# Patient Record
Sex: FEMALE | Race: WHITE | NOT HISPANIC OR LATINO | URBAN - METROPOLITAN AREA
[De-identification: names, ages, dates, MRNs, and addresses within clinical notes are randomized per-mention and may not be internally consistent; named-entity substitution may affect disease eponyms.]

---

## 2018-02-02 ENCOUNTER — NEW PATIENT (OUTPATIENT)
Dept: URBAN - METROPOLITAN AREA CLINIC 10 | Facility: CLINIC | Age: 68
End: 2018-02-02
Payer: MEDICARE

## 2018-02-02 DIAGNOSIS — H16.042 MARGINAL CORNEAL ULCER OF LEFT EYE: Primary | ICD-10-CM

## 2018-02-02 PROCEDURE — 99203 OFFICE O/P NEW LOW 30 MIN: CPT | Performed by: OPTOMETRIST

## 2018-02-02 RX ORDER — PREDNISOLONE ACETATE 10 MG/ML
1 % SUSPENSION/ DROPS OPHTHALMIC
Qty: 1 | Refills: 1 | Status: ACTIVE
Start: 2018-02-02

## 2018-02-02 ASSESSMENT — INTRAOCULAR PRESSURE
OS: 10
OD: 15

## 2018-02-03 ENCOUNTER — FOLLOW UP ESTABLISHED (OUTPATIENT)
Dept: URBAN - METROPOLITAN AREA CLINIC 10 | Facility: CLINIC | Age: 68
End: 2018-02-03
Payer: MEDICARE

## 2018-02-03 PROCEDURE — 92012 INTRM OPH EXAM EST PATIENT: CPT | Performed by: OPTOMETRIST

## 2018-02-07 ENCOUNTER — FOLLOW UP ESTABLISHED (OUTPATIENT)
Dept: URBAN - METROPOLITAN AREA CLINIC 10 | Facility: CLINIC | Age: 68
End: 2018-02-07
Payer: MEDICARE

## 2018-02-07 PROCEDURE — 92012 INTRM OPH EXAM EST PATIENT: CPT | Performed by: OPTOMETRIST

## 2018-02-07 RX ORDER — OFLOXACIN 3 MG/ML
0.3 % SOLUTION/ DROPS OPHTHALMIC
Qty: 0 | Refills: 0 | Status: ACTIVE
Start: 2018-02-07

## 2021-12-02 ENCOUNTER — OFFICE VISIT (OUTPATIENT)
Dept: URBAN - METROPOLITAN AREA CLINIC 10 | Facility: CLINIC | Age: 71
End: 2021-12-02
Payer: MEDICARE

## 2021-12-02 PROCEDURE — 99204 OFFICE O/P NEW MOD 45 MIN: CPT | Performed by: STUDENT IN AN ORGANIZED HEALTH CARE EDUCATION/TRAINING PROGRAM

## 2021-12-02 RX ORDER — ACYCLOVIR 800 MG/1
800 MG TABLET ORAL
Qty: 50 | Refills: 0 | Status: INACTIVE
Start: 2021-12-02 | End: 2021-12-11

## 2021-12-02 NOTE — IMPRESSION/PLAN
Impression: Herpesviral keratitis: B00.52. Plan: Start ACV 800mg 5 times PO for 10 days RTC 2 week short fu

## 2021-12-16 ENCOUNTER — OFFICE VISIT (OUTPATIENT)
Dept: URBAN - METROPOLITAN AREA CLINIC 10 | Facility: CLINIC | Age: 71
End: 2021-12-16
Payer: MEDICARE

## 2021-12-16 DIAGNOSIS — B00.52 HERPESVIRAL KERATITIS: Primary | ICD-10-CM

## 2021-12-16 PROCEDURE — 99214 OFFICE O/P EST MOD 30 MIN: CPT | Performed by: STUDENT IN AN ORGANIZED HEALTH CARE EDUCATION/TRAINING PROGRAM

## 2021-12-16 RX ORDER — PREDNISOLONE ACETATE 10 MG/ML
1 % SUSPENSION/ DROPS OPHTHALMIC
Qty: 10 | Refills: 1 | Status: ACTIVE
Start: 2021-12-16

## 2021-12-16 NOTE — IMPRESSION/PLAN
Impression: Herpesviral keratitis: B00.52. Plan: Resolved today Start Pred Forte QID OD x 1 week then weekly taper RTC 1mo for brief f/u

## 2021-12-29 ENCOUNTER — OFFICE VISIT (OUTPATIENT)
Dept: URBAN - METROPOLITAN AREA CLINIC 10 | Facility: CLINIC | Age: 71
End: 2021-12-29
Payer: MEDICARE

## 2021-12-29 DIAGNOSIS — H16.142 PUNCTATE KERATITIS OF LEFT EYE: Primary | ICD-10-CM

## 2021-12-29 PROCEDURE — 92012 INTRM OPH EXAM EST PATIENT: CPT | Performed by: OPTOMETRIST

## 2021-12-29 NOTE — IMPRESSION/PLAN
Impression: Punctate keratitis of left eye: H16.142. Plan: No infection today. Start Pred Forte QD OS x 1 wk. RTC as scheduled.

## 2022-01-17 ENCOUNTER — OFFICE VISIT (OUTPATIENT)
Dept: URBAN - METROPOLITAN AREA CLINIC 10 | Facility: CLINIC | Age: 72
End: 2022-01-17
Payer: MEDICARE

## 2022-01-17 DIAGNOSIS — H16.223 KERATOCONJUNCTIVITIS SICCA, BILATERAL: Primary | ICD-10-CM

## 2022-01-17 DIAGNOSIS — H17.89 OTHER CORNEAL SCARS: ICD-10-CM

## 2022-01-17 PROCEDURE — 99213 OFFICE O/P EST LOW 20 MIN: CPT | Performed by: STUDENT IN AN ORGANIZED HEALTH CARE EDUCATION/TRAINING PROGRAM

## 2022-01-17 NOTE — IMPRESSION/PLAN
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 Plan: Patient educated on condition, hx of HZV OD, monitor.

## 2023-01-18 ENCOUNTER — OFFICE VISIT (OUTPATIENT)
Dept: URBAN - METROPOLITAN AREA CLINIC 10 | Facility: CLINIC | Age: 73
End: 2023-01-18
Payer: MEDICARE

## 2023-01-18 DIAGNOSIS — H35.3132 BILATERAL NONEXUDATIVE AGE-RELATED MACULAR DEGENERATION, INTERMEDIATE DRY STAGE: Primary | ICD-10-CM

## 2023-01-18 DIAGNOSIS — H25.813 COMBINED FORMS OF AGE-RELATED CATARACT, BILATERAL: ICD-10-CM

## 2023-01-18 DIAGNOSIS — H17.89 OTHER CORNEAL SCARS: ICD-10-CM

## 2023-01-18 PROCEDURE — 99214 OFFICE O/P EST MOD 30 MIN: CPT | Performed by: OPTOMETRIST

## 2023-01-18 PROCEDURE — 92134 CPTRZ OPH DX IMG PST SGM RTA: CPT | Performed by: OPTOMETRIST

## 2023-01-18 ASSESSMENT — VISUAL ACUITY
OS: 20/25
OD: 20/40

## 2023-01-18 ASSESSMENT — INTRAOCULAR PRESSURE
OD: 18
OS: 18

## 2023-01-18 ASSESSMENT — KERATOMETRY
OD: 42.63
OS: 42.75

## 2023-01-18 NOTE — IMPRESSION/PLAN
Impression: Bilateral nonexudative age-related macular degeneration, intermediate dry stage: H35.3132. Plan: Patient educated regarding findings. MAC OCT reviewed. Recommend patient take an AREDS formula multiple vitamin daily PO. Recommend no smoking, increasing leafy green vegetables in diet, and UV protection. Recommend monocular home monitoring. Observation is all that is indicated at this time. RTC 1 year for DFE and possible MAC OCT or sooner with any sudden change in vision.

## 2023-01-18 NOTE — IMPRESSION/PLAN
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 OD - linear 4mm x 1mm Plan: Patient educated on condition, hx of HZV OD, monitor. Pt had recurring episode in November. Pt sees cornea specialist in [facility]. Pt is currently on Pred BID OD ~2 months (tapered down from 6x/day). Pt takes Valtrex QD PO, continue indefinitely.